# Patient Record
Sex: MALE | Race: WHITE | NOT HISPANIC OR LATINO | URBAN - METROPOLITAN AREA
[De-identification: names, ages, dates, MRNs, and addresses within clinical notes are randomized per-mention and may not be internally consistent; named-entity substitution may affect disease eponyms.]

---

## 2018-04-18 ENCOUNTER — OUTPATIENT (OUTPATIENT)
Dept: OUTPATIENT SERVICES | Facility: HOSPITAL | Age: 53
LOS: 1 days | Discharge: HOME | End: 2018-04-18

## 2018-04-18 DIAGNOSIS — I10 ESSENTIAL (PRIMARY) HYPERTENSION: ICD-10-CM

## 2018-04-18 DIAGNOSIS — N42.9 DISORDER OF PROSTATE, UNSPECIFIED: ICD-10-CM

## 2018-04-18 DIAGNOSIS — D64.9 ANEMIA, UNSPECIFIED: ICD-10-CM

## 2018-04-18 DIAGNOSIS — E55.9 VITAMIN D DEFICIENCY, UNSPECIFIED: ICD-10-CM

## 2018-04-18 DIAGNOSIS — N39.0 URINARY TRACT INFECTION, SITE NOT SPECIFIED: ICD-10-CM

## 2021-01-12 PROBLEM — Z00.00 ENCOUNTER FOR PREVENTIVE HEALTH EXAMINATION: Status: ACTIVE | Noted: 2021-01-12

## 2021-01-20 ENCOUNTER — APPOINTMENT (OUTPATIENT)
Dept: ORTHOPEDIC SURGERY | Facility: CLINIC | Age: 56
End: 2021-01-20
Payer: COMMERCIAL

## 2021-01-20 VITALS — TEMPERATURE: 96.7 F

## 2021-01-20 DIAGNOSIS — M25.859 OTHER SPECIFIED JOINT DISORDERS, UNSPECIFIED HIP: ICD-10-CM

## 2021-01-20 DIAGNOSIS — M89.8X9 OTHER SPECIFIED DISORDERS OF BONE, UNSPECIFIED SITE: ICD-10-CM

## 2021-01-20 DIAGNOSIS — Z78.9 OTHER SPECIFIED HEALTH STATUS: ICD-10-CM

## 2021-01-20 DIAGNOSIS — M25.551 PAIN IN RIGHT HIP: ICD-10-CM

## 2021-01-20 PROCEDURE — 73502 X-RAY EXAM HIP UNI 2-3 VIEWS: CPT | Mod: RT

## 2021-01-20 PROCEDURE — 99204 OFFICE O/P NEW MOD 45 MIN: CPT

## 2021-01-20 PROCEDURE — 99072 ADDL SUPL MATRL&STAF TM PHE: CPT

## 2021-01-20 NOTE — ASSESSMENT
[FreeTextEntry1] : 55 year old male presents for right hip pain. The pain is positional. He is a , he is very active. He performs olympic lifting. He feels a mild pain 2/10 when the hip is in certain positions. And he is concerned that he had severe arthritis in his hip given his lifestyle. He said that he was also very active growing up, he was a  and a . His exam and radiographs are consistent with BEN, and HO. But we will obtain MRI for confirmation. It was recommended he go to [facility] for the study, he should notify us after test is done and we will call him with results.

## 2021-01-20 NOTE — HISTORY OF PRESENT ILLNESS
[de-identified] : 55 year old male presents to the office today complaining of right hip pain x1 year. Patient reports pain that is in the groin, can be at the anterior thigh and radiating into the knee. He reports pain that is achy in nature and comes and goes. Patient denies any swelling, buckling, locking, or loss of motion, but does report noticing a clicking in his right hip. Patient states he is a  and is very active. He does okay with ambulation stating he ran 2 miles this morning. There is pain when he negotiates stairs but states he does okay with this. He also reports noticing discomfort when putting socks and shoes on. Patient is here today to discuss his options for pain relief.  [Pain Location] : pain [] : right hip [5] : a maximum pain level of 5/10 [Intermit.] : ~He/She~ states the symptoms seem to be intermittent

## 2021-01-20 NOTE — PHYSICAL EXAM
[de-identified] : General appearance: well nourished and hydrated, pleasant, alert and oriented x 3, cooperative.\par Cardiovascular: no apparent abnormalities, no lower leg edema, no varicosities, pedal pulses are palpable.\par Neurologic: sensation is normal, no muscle weakness in upper or lower extremities\par Dermatologic no apparent skin lesions, moist, warm, no rash.\par Gait: nonantalgic.\par \par Left knee\par Inspection: no effusion or erythema.\par Wounds: none.\par Alignment: normal.\par Palpation: no specific tenderness on palpation.\par ROM: 0-125 degrees\par Ligamentous laxity: all ligaments appear stable\par Muscle Test: good quad strength.\par \par Right knee\par Inspection: no effusion or erythema.\par Wounds: none.\par Alignment: normal.\par Palpation: no specific tenderness on palpation.\par ROM: 0-125 degrees\par Ligamentous laxity: all ligaments appear stable\par Muscle Test: good quad strength.\par \par Left hip\par Inspection: No swelling or ecchymosis.\par Wounds: none.\par Palpation: non-tender.\par Stability: no instability.\par Strength: 5/5 all motor groups.\par ROM: Flexion to 90 degrees, ER 30, IR 10, ABD 45 degrees, WITHOUT PAIN\par Leg length: equal.\par \par Right hip\par Inspection: No swelling or ecchymosis.\par Wounds: none.\par Palpation: non-tender.\par Stability: no instability.\par Strength: 5/5 all motor groups.\par ROM: Flexion to 85 degrees, IR 5, ER 40, ABD 45 degrees. With flexion and ER of the hip he had 2/10 pain. \par Leg length: equal.\par \par  [de-identified] : Radiographs done today AP pelvis and lateral of the right hip shows the bony structures are intact. well maintained joint spaces bilaterally. 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 On the lateral view the femoral head appears to be slightly misshaped, questionable prominence at head neck junction. And an island of what appears to be heterotopic ossification anterior to proximal femur. Impression 1 DDH 2 BEN 3 HO.

## 2022-03-20 ENCOUNTER — OUTPATIENT (OUTPATIENT)
Dept: OUTPATIENT SERVICES | Facility: HOSPITAL | Age: 57
LOS: 1 days | Discharge: HOME | End: 2022-03-20
Payer: COMMERCIAL

## 2022-03-20 DIAGNOSIS — D21.0 BENIGN NEOPLASM OF CONNECTIVE AND OTHER SOFT TISSUE OF HEAD, FACE AND NECK: ICD-10-CM

## 2022-03-20 PROCEDURE — 70492 CT SFT TSUE NCK W/O & W/DYE: CPT | Mod: 26

## 2023-03-16 ENCOUNTER — APPOINTMENT (OUTPATIENT)
Dept: ORTHOPEDIC SURGERY | Facility: CLINIC | Age: 58
End: 2023-03-16

## 2023-05-01 ENCOUNTER — APPOINTMENT (OUTPATIENT)
Dept: ORTHOPEDIC SURGERY | Facility: CLINIC | Age: 58
End: 2023-05-01
Payer: COMMERCIAL

## 2023-05-01 DIAGNOSIS — S33.5XXA SPRAIN OF LIGAMENTS OF LUMBAR SPINE, INITIAL ENCOUNTER: ICD-10-CM

## 2023-05-01 PROCEDURE — 72110 X-RAY EXAM L-2 SPINE 4/>VWS: CPT

## 2023-05-01 PROCEDURE — 99203 OFFICE O/P NEW LOW 30 MIN: CPT

## 2023-05-01 NOTE — PHYSICAL EXAM
[de-identified] : TTP midline spine and paraspinal musculature \par Strength                                         \par Hip flexor\par   Right: 5/5; Left: 5/5                             \par Knee extensor  \par   Right: 5/5; Left: 5/5                     \par Ankle dorsiflexion\par   Right: 5/5; Left: 5/5                  \par EHL        \par   Right: 5/5; Left: 5/5                                \par Ankle plantarflexion    \par   Right: 5/5; Left: 5/5\par \par Sensation\par L1\par   Right: 2/2; Left: 2/2\par L2\par   Right: 2/2; Left: 2/2\par L3\par   Right: 2/2; Left: 2/2\par L4\par   Right: 2/2; Left: 2/2\par L5\par   Right: 2/2; Left: 2/2\par S1\par   Right: 2/2; Left: 2/2\par \par Reflexes\par Patella\par   Right: 2+; Left 2+\par Achilles\par   Right: 2+; Left 2+\par Clonus\par  Right: absent; L: absent\par

## 2023-05-01 NOTE — DATA REVIEWED
[FreeTextEntry1] : I obtained and reviewed AP lateral flexion-extension lumbar x-ray.  No instability.  Minimal disc degeneration.

## 2023-05-01 NOTE — DISCUSSION/SUMMARY
[de-identified] : 57-year-old male presents with lumbar sprain.  I am giving the patient a prescription for physical therapy.  He can follow-up as needed.  I recommended that he follow-up with interventional pain management if his pain persists.  He is not keen on injections.

## 2023-05-01 NOTE — HISTORY OF PRESENT ILLNESS
[de-identified] : 57-year-old male presents to me with 6 months of low back pain.  He denies any pain radiating down his legs.  Denies numbness or tingling.  He is a  very fit and active.  Denies loss of bladder or bowel.  He has not done formal physical therapy yet.  He has not taken any anti-inflammatories.

## 2023-09-14 ENCOUNTER — EMERGENCY (EMERGENCY)
Facility: HOSPITAL | Age: 58
LOS: 0 days | Discharge: HOME | End: 2023-09-14
Attending: STUDENT IN AN ORGANIZED HEALTH CARE EDUCATION/TRAINING PROGRAM
Payer: COMMERCIAL

## 2023-09-14 VITALS
OXYGEN SATURATION: 98 % | SYSTOLIC BLOOD PRESSURE: 133 MMHG | RESPIRATION RATE: 20 BRPM | TEMPERATURE: 98 F | HEIGHT: 70 IN | WEIGHT: 205.03 LBS | DIASTOLIC BLOOD PRESSURE: 73 MMHG | HEART RATE: 77 BPM

## 2023-09-14 DIAGNOSIS — M25.562 PAIN IN LEFT KNEE: ICD-10-CM

## 2023-09-14 DIAGNOSIS — R50.9 FEVER, UNSPECIFIED: ICD-10-CM

## 2023-09-14 PROCEDURE — 73564 X-RAY EXAM KNEE 4 OR MORE: CPT | Mod: LT

## 2023-09-14 PROCEDURE — 99283 EMERGENCY DEPT VISIT LOW MDM: CPT

## 2023-09-14 PROCEDURE — 73564 X-RAY EXAM KNEE 4 OR MORE: CPT | Mod: 26,LT

## 2023-09-14 PROCEDURE — 99283 EMERGENCY DEPT VISIT LOW MDM: CPT | Mod: 25

## 2023-09-14 RX ORDER — ACETAMINOPHEN 500 MG
650 TABLET ORAL ONCE
Refills: 0 | Status: COMPLETED | OUTPATIENT
Start: 2023-09-14 | End: 2023-09-14

## 2023-09-14 RX ADMIN — Medication 650 MILLIGRAM(S): at 22:30

## 2023-09-14 NOTE — ED PROVIDER NOTE - OBJECTIVE STATEMENT
58-year-old male no notable past medical history coming with complaints of left knee pain.  Reports the knee has been bothering for the past 2 days, burning in nature, feels like it is warm, associated with some tactile fever, kept in check with Motrin around-the-clock, able to ambulate when he is taking the Motrin.  Notes he did return from [place] 3 days ago.  Works as a .  Does note he believes he had similar episodes in the past but much more mild. Denies any  nausea, vomiting, CP, SOB, changes in urination, or changes in bowel movements.

## 2023-09-14 NOTE — ED PROVIDER NOTE - ATTENDING CONTRIBUTION TO CARE
58-year-old male no notable past medical history coming with complaints of left knee pain.  Reports the knee has been bothering for the past 2 days, burning in nature, feels like it is warm, associated with some tactile fever, kept in check with Motrin around-the-clock, able to ambulate when he is taking the Motrin.  Notes he did return from [place] 3 days ago.  Works as a  and noticed pain after a day of working out a lot.  Does note he believes he had similar episodes in the past but much more mild. Denies any  nausea, vomiting, CP, SOB, changes in urination, or changes in bowel movements.    On exam, left knee mildly swollen, non-tender, normal ROM, no erythema or rash or ecchymoses, no calf swelling or tenderness and b/l equal in size, ambulates independently with limp.

## 2023-09-14 NOTE — ED PROVIDER NOTE - NSFOLLOWUPINSTRUCTIONS_ED_ALL_ED_FT
Please follow-up with PCP in 1 to 3 days. Return precautions explained in full to patient/family.    Our Emergency Department Referral Coordinators will be reaching out to you in the next 24-48 hours from 9:00am to 5:00pm with a follow up appointment. Please expect a phone call from the hospital in that time frame. If you do not receive a call or if you have any questions or concerns, you can reach them at   [phone]        English    Gout  A foot with gout, showing uric acid crystals in a joint, along with an image that shows swelling on the outside of the foot.  Gout is a condition that causes painful swelling of the joints. Gout is a type of inflammation of the joints (arthritis). This condition is caused by having too much uric acid in the body. Uric acid is a chemical that forms when the body breaks down substances called purines. Purines are important for building body proteins.    When the body has too much uric acid, sharp crystals can form and build up inside the joints. This causes pain and swelling. Gout attacks can happen quickly and may be very painful (acute gout). Over time, the attacks can affect more joints and become more frequent (chronic gout). Gout can also cause uric acid to build up under the skin and inside the kidneys.    What are the causes?  This condition is caused by too much uric acid in your blood. This can happen because:  Your kidneys do not remove enough uric acid from your blood. This is the most common cause.  Your body makes too much uric acid. This can happen with some cancers and cancer treatments. It can also occur if your body is breaking down too many red blood cells (hemolytic anemia).  You eat too many foods that are high in purines. These foods include organ meats and some seafood. Alcohol, especially beer, is also high in purines.  A gout attack may be triggered by trauma or stress.    What increases the risk?  The following factors may make you more likely to develop this condition:  Having a family history of gout.  Being male and middle-aged.  Being female and having gone through menopause.  Taking certain medicines, including aspirin, cyclosporine, diuretics, levodopa, and niacin.  Having an organ transplant.  Having certain conditions, such as:  Being obese.  Lead poisoning.  Kidney disease.  A skin condition called psoriasis.  Other factors include:  Losing weight too quickly.  Being dehydrated.  Frequently drinking alcohol, especially beer.  Frequently drinking beverages that are sweetened with a type of sugar called fructose.  What are the signs or symptoms?  A foot and ankle with swelling and shiny, purple skin caused by gout.   An attack of acute gout happens quickly. It usually occurs in just one joint. The most common place is the big toe. Attacks often start at night. Other joints that may be affected include joints of the feet, ankle, knee, fingers, wrist, or elbow. Symptoms of this condition may include:  Severe pain.  Warmth.  Swelling.  Stiffness.  Tenderness. The affected joint may be very painful to touch.  Shiny, red, or purple skin.  Chills and fever.  Chronic gout may cause symptoms more frequently. More joints may be involved. You may also have white or yellow lumps (tophi) on your hands or feet or in other areas near your joints.    How is this diagnosed?  This condition is diagnosed based on your symptoms, your medical history, and a physical exam. You may have tests, such as:  Blood tests to measure uric acid levels.  Removal of joint fluid with a thin needle (aspiration) to look for uric acid crystals.  X-rays to look for joint damage.  How is this treated?  Treatment for this condition has two phases: treating an acute attack and preventing future attacks. Acute gout treatment may include medicines to reduce pain and swelling, including:  NSAIDs, such as ibuprofen.  Steroids. These are strong anti-inflammatory medicines that can be taken by mouth (orally) or injected into a joint.  Colchicine. This medicine relieves pain and swelling when it is taken soon after an attack. It can be given by mouth or through an IV.  Preventive treatment may include:  Daily use of smaller doses of NSAIDs or colchicine.  Use of a medicine that reduces uric acid levels in your blood, such as allopurinol.  Changes to your diet. You may need to see a dietitian about what to eat and drink to prevent gout.  Follow these instructions at home:  During a gout attack    Bag of ice on a towel on the skin.   If directed, put ice on the affected area. To do this:  Put ice in a plastic bag.  Place a towel between your skin and the bag.  Leave the ice on for 20 minutes, 2–3 times a day.  Remove the ice if your skin turns bright red. This is very important. If you cannot feel pain, heat, or cold, you have a greater risk of damage to the area.  Raise (elevate) the affected joint above the level of your heart as often as possible.  Rest the joint as much as possible. If the affected joint is in your leg, you may be given crutches to use.  Follow instructions from your health care provider about eating or drinking restrictions.  Avoiding future gout attacks    Follow a low-purine diet as told by your dietitian or health care provider. Avoid foods and drinks that are high in purines, including liver, kidney, anchovies, asparagus, herring, mushrooms, mussels, and beer.  Maintain a healthy weight or lose weight if you are overweight. If you want to lose weight, talk with your health care provider. Do not lose weight too quickly.  Start or maintain an exercise program as told by your health care provider.  Eating and drinking    Avoid drinking beverages that contain fructose.  Drink enough fluids to keep your urine pale yellow.  If you drink alcohol:  Limit how much you have to:  0–1 drink a day for women who are not pregnant.  0–2 drinks a day for men.  Know how much alcohol is in a drink. In the [place] one drink equals one 12 oz bottle of beer (355 mL), one 5 oz glass of wine (148 mL), or one 1½ oz glass of hard liquor (44 mL).  General instructions    Take over-the-counter and prescription medicines only as told by your health care provider.  Ask your health care provider if the medicine prescribed to you requires you to avoid driving or using machinery.  Return to your normal activities as told by your health care provider. Ask your health care provider what activities are safe for you.  Keep all follow-up visits. This is important.  Where to find more information  National Institutes of Health: www.niams.nih.gov  Contact a health care provider if you have:  Another gout attack.  Continuing symptoms of a gout attack after 10 days of treatment.  Side effects from your medicines.  Chills or a fever.  Burning pain when you urinate.  Pain in your lower back or abdomen.  Get help right away if you:  Have severe or uncontrolled pain.  Cannot urinate.  Summary  Gout is painful swelling of the joints caused by having too much uric acid in the body.  The most common site for gout to occur is in the big toe, but it can affect other joints in the body.  Medicines and dietary changes can help to prevent and treat gout attacks.  This information is not intended to replace advice given to you by your health care provider. Make sure you discuss any questions you have with your health care provider.    Document Revised: 09/21/2022 Document Reviewed: 09/21/2022  Elsevier Patient Education © 2023 [employer]. Please follow-up with orthopedics in 1 to 3 days. Return precautions explained in full to patient/family.    Our Emergency Department Referral Coordinators will be reaching out to you in the next 24-48 hours from 9:00am to 5:00pm with a follow up appointment. Please expect a phone call from the hospital in that time frame. If you do not receive a call or if you have any questions or concerns, you can reach them at   [phone]        English    Gout  A foot with gout, showing uric acid crystals in a joint, along with an image that shows swelling on the outside of the foot.  Gout is a condition that causes painful swelling of the joints. Gout is a type of inflammation of the joints (arthritis). This condition is caused by having too much uric acid in the body. Uric acid is a chemical that forms when the body breaks down substances called purines. Purines are important for building body proteins.    When the body has too much uric acid, sharp crystals can form and build up inside the joints. This causes pain and swelling. Gout attacks can happen quickly and may be very painful (acute gout). Over time, the attacks can affect more joints and become more frequent (chronic gout). Gout can also cause uric acid to build up under the skin and inside the kidneys.    What are the causes?  This condition is caused by too much uric acid in your blood. This can happen because:  Your kidneys do not remove enough uric acid from your blood. This is the most common cause.  Your body makes too much uric acid. This can happen with some cancers and cancer treatments. It can also occur if your body is breaking down too many red blood cells (hemolytic anemia).  You eat too many foods that are high in purines. These foods include organ meats and some seafood. Alcohol, especially beer, is also high in purines.  A gout attack may be triggered by trauma or stress.    What increases the risk?  The following factors may make you more likely to develop this condition:  Having a family history of gout.  Being male and middle-aged.  Being female and having gone through menopause.  Taking certain medicines, including aspirin, cyclosporine, diuretics, levodopa, and niacin.  Having an organ transplant.  Having certain conditions, such as:  Being obese.  Lead poisoning.  Kidney disease.  A skin condition called psoriasis.  Other factors include:  Losing weight too quickly.  Being dehydrated.  Frequently drinking alcohol, especially beer.  Frequently drinking beverages that are sweetened with a type of sugar called fructose.  What are the signs or symptoms?  A foot and ankle with swelling and shiny, purple skin caused by gout.   An attack of acute gout happens quickly. It usually occurs in just one joint. The most common place is the big toe. Attacks often start at night. Other joints that may be affected include joints of the feet, ankle, knee, fingers, wrist, or elbow. Symptoms of this condition may include:  Severe pain.  Warmth.  Swelling.  Stiffness.  Tenderness. The affected joint may be very painful to touch.  Shiny, red, or purple skin.  Chills and fever.  Chronic gout may cause symptoms more frequently. More joints may be involved. You may also have white or yellow lumps (tophi) on your hands or feet or in other areas near your joints.    How is this diagnosed?  This condition is diagnosed based on your symptoms, your medical history, and a physical exam. You may have tests, such as:  Blood tests to measure uric acid levels.  Removal of joint fluid with a thin needle (aspiration) to look for uric acid crystals.  X-rays to look for joint damage.  How is this treated?  Treatment for this condition has two phases: treating an acute attack and preventing future attacks. Acute gout treatment may include medicines to reduce pain and swelling, including:  NSAIDs, such as ibuprofen.  Steroids. These are strong anti-inflammatory medicines that can be taken by mouth (orally) or injected into a joint.  Colchicine. This medicine relieves pain and swelling when it is taken soon after an attack. It can be given by mouth or through an IV.  Preventive treatment may include:  Daily use of smaller doses of NSAIDs or colchicine.  Use of a medicine that reduces uric acid levels in your blood, such as allopurinol.  Changes to your diet. You may need to see a dietitian about what to eat and drink to prevent gout.  Follow these instructions at home:  During a gout attack    Bag of ice on a towel on the skin.   If directed, put ice on the affected area. To do this:  Put ice in a plastic bag.  Place a towel between your skin and the bag.  Leave the ice on for 20 minutes, 2–3 times a day.  Remove the ice if your skin turns bright red. This is very important. If you cannot feel pain, heat, or cold, you have a greater risk of damage to the area.  Raise (elevate) the affected joint above the level of your heart as often as possible.  Rest the joint as much as possible. If the affected joint is in your leg, you may be given crutches to use.  Follow instructions from your health care provider about eating or drinking restrictions.  Avoiding future gout attacks    Follow a low-purine diet as told by your dietitian or health care provider. Avoid foods and drinks that are high in purines, including liver, kidney, anchovies, asparagus, herring, mushrooms, mussels, and beer.  Maintain a healthy weight or lose weight if you are overweight. If you want to lose weight, talk with your health care provider. Do not lose weight too quickly.  Start or maintain an exercise program as told by your health care provider.  Eating and drinking    Avoid drinking beverages that contain fructose.  Drink enough fluids to keep your urine pale yellow.  If you drink alcohol:  Limit how much you have to:  0–1 drink a day for women who are not pregnant.  0–2 drinks a day for men.  Know how much alcohol is in a drink. In the [place] one drink equals one 12 oz bottle of beer (355 mL), one 5 oz glass of wine (148 mL), or one 1½ oz glass of hard liquor (44 mL).  General instructions    Take over-the-counter and prescription medicines only as told by your health care provider.  Ask your health care provider if the medicine prescribed to you requires you to avoid driving or using machinery.  Return to your normal activities as told by your health care provider. Ask your health care provider what activities are safe for you.  Keep all follow-up visits. This is important.  Where to find more information  National Institutes of Health: www.niams.nih.gov  Contact a health care provider if you have:  Another gout attack.  Continuing symptoms of a gout attack after 10 days of treatment.  Side effects from your medicines.  Chills or a fever.  Burning pain when you urinate.  Pain in your lower back or abdomen.  Get help right away if you:  Have severe or uncontrolled pain.  Cannot urinate.  Summary  Gout is painful swelling of the joints caused by having too much uric acid in the body.  The most common site for gout to occur is in the big toe, but it can affect other joints in the body.  Medicines and dietary changes can help to prevent and treat gout attacks.  This information is not intended to replace advice given to you by your health care provider. Make sure you discuss any questions you have with your health care provider.    Document Revised: 09/21/2022 Document Reviewed: 09/21/2022  Elsevier Patient Education © 2023 Else[employer] Inc.

## 2023-09-14 NOTE — ED PROVIDER NOTE - PHYSICAL EXAMINATION
CONSTITUTIONAL: NAD  SKIN: Warm dry  HEAD: NCAT  EYES: NL inspection  ENT: MMM  NECK: Supple; non tender.  CARD: RRR  RESP: CTAB  EXT: Left knee with minimal swelling relative to right, warm, nonerythematous, mildly tender.  Patient able to ambulate with some difficulty.  Neurovascularly intact.  NEURO: Grossly unremarkable  PSYCH: Cooperative, appropriate.

## 2023-09-14 NOTE — ED PROVIDER NOTE - CARE PROVIDERS DIRECT ADDRESSES
[email or web address]

## 2023-09-14 NOTE — ED PROVIDER NOTE - CLINICAL SUMMARY MEDICAL DECISION MAKING FREE TEXT BOX
58-year-old male no notable past medical history coming with complaints of left knee pain.  Reports the knee has been bothering for the past 2 days, burning in nature, improved with Motrin 600 mg. On exam, left knee mildly swollen, non-tender, normal ROM, no erythema or rash or ecchymoses, no calf swelling or tenderness and b/l equal in size, ambulates independently with limp. Given strict return precautions and follow up with ortho. Patient and his wife comfortable with discharge and follow up with orthopedics. Imaging ordered and reviewed by me and discussed with patient.

## 2023-09-14 NOTE — ED PROVIDER NOTE - CARE PROVIDER_API CALL
Rajiv Hopper  Orthopaedic Surgery  [address] [place] [address]  Phone: [phone]  Fax: [phone]  Follow Up Time: 1-3 Days

## 2023-09-14 NOTE — ED PROVIDER NOTE - PATIENT PORTAL LINK FT
You can access the FollowMyHealth Patient Portal offered by [facility] by registering at the following website: [email or web address]. By joining [employer]’s FollowMyHealth portal, you will also be able to view your health information using other applications (apps) compatible with our system.

## 2023-09-14 NOTE — ED ADULT TRIAGE NOTE - CHIEF COMPLAINT QUOTE
PT presents to the Ed from home c/o of L knee pain x4 days. Pt denies any recent trauma to the area or fall. Pt states it difficult to put weight on leg. Pt states he took Motrin recently which has helped a bit.

## 2023-09-16 ENCOUNTER — APPOINTMENT (OUTPATIENT)
Dept: ORTHOPEDIC SURGERY | Facility: CLINIC | Age: 58
End: 2023-09-16
Payer: COMMERCIAL

## 2023-09-16 VITALS — HEIGHT: 69 IN | BODY MASS INDEX: 29.62 KG/M2 | WEIGHT: 200 LBS

## 2023-09-16 DIAGNOSIS — M17.12 UNILATERAL PRIMARY OSTEOARTHRITIS, LEFT KNEE: ICD-10-CM

## 2023-09-16 PROCEDURE — 99203 OFFICE O/P NEW LOW 30 MIN: CPT

## 2023-10-17 ENCOUNTER — APPOINTMENT (OUTPATIENT)
Dept: ORTHOPEDIC SURGERY | Facility: CLINIC | Age: 58
End: 2023-10-17

## 2024-11-15 ENCOUNTER — APPOINTMENT (OUTPATIENT)
Dept: CARDIOLOGY | Facility: CLINIC | Age: 59
End: 2024-11-15
Payer: COMMERCIAL

## 2024-11-15 ENCOUNTER — NON-APPOINTMENT (OUTPATIENT)
Age: 59
End: 2024-11-15

## 2024-11-15 VITALS
SYSTOLIC BLOOD PRESSURE: 125 MMHG | WEIGHT: 216 LBS | HEART RATE: 73 BPM | BODY MASS INDEX: 31.99 KG/M2 | HEIGHT: 69 IN | DIASTOLIC BLOOD PRESSURE: 80 MMHG

## 2024-11-15 DIAGNOSIS — Z87.898 PERSONAL HISTORY OF OTHER SPECIFIED CONDITIONS: ICD-10-CM

## 2024-11-15 DIAGNOSIS — R06.02 SHORTNESS OF BREATH: ICD-10-CM

## 2024-11-15 DIAGNOSIS — I25.119 ATHEROSCLEROTIC HEART DISEASE OF NATIVE CORONARY ARTERY WITH UNSPECIFIED ANGINA PECTORIS: ICD-10-CM

## 2024-11-15 DIAGNOSIS — Z78.9 OTHER SPECIFIED HEALTH STATUS: ICD-10-CM

## 2024-11-15 DIAGNOSIS — E78.5 HYPERLIPIDEMIA, UNSPECIFIED: ICD-10-CM

## 2024-11-15 PROCEDURE — 99204 OFFICE O/P NEW MOD 45 MIN: CPT | Mod: 25

## 2024-11-15 PROCEDURE — 93000 ELECTROCARDIOGRAM COMPLETE: CPT

## 2024-12-16 ENCOUNTER — APPOINTMENT (OUTPATIENT)
Dept: CARDIOLOGY | Facility: CLINIC | Age: 59
End: 2024-12-16
Payer: COMMERCIAL

## 2024-12-16 DIAGNOSIS — I25.119 ATHEROSCLEROTIC HEART DISEASE OF NATIVE CORONARY ARTERY WITH UNSPECIFIED ANGINA PECTORIS: ICD-10-CM

## 2024-12-16 DIAGNOSIS — R06.02 SHORTNESS OF BREATH: ICD-10-CM

## 2024-12-16 PROCEDURE — 93325 DOPPLER ECHO COLOR FLOW MAPG: CPT

## 2024-12-16 PROCEDURE — 93320 DOPPLER ECHO COMPLETE: CPT

## 2024-12-16 PROCEDURE — 93351 STRESS TTE COMPLETE: CPT

## 2025-02-03 ENCOUNTER — APPOINTMENT (OUTPATIENT)
Dept: UROLOGY | Facility: CLINIC | Age: 60
End: 2025-02-03
Payer: COMMERCIAL

## 2025-02-03 VITALS
TEMPERATURE: 98.5 F | HEART RATE: 69 BPM | BODY MASS INDEX: 31.1 KG/M2 | DIASTOLIC BLOOD PRESSURE: 81 MMHG | SYSTOLIC BLOOD PRESSURE: 129 MMHG | HEIGHT: 69 IN | OXYGEN SATURATION: 96 % | RESPIRATION RATE: 18 BRPM | WEIGHT: 210 LBS

## 2025-02-03 DIAGNOSIS — R97.20 ELEVATED PROSTATE, SPECIFIC ANTIGEN [PSA]: ICD-10-CM

## 2025-02-03 DIAGNOSIS — N40.1 BENIGN PROSTATIC HYPERPLASIA WITH LOWER URINARY TRACT SYMPMS: ICD-10-CM

## 2025-02-03 DIAGNOSIS — R35.1 BENIGN PROSTATIC HYPERPLASIA WITH LOWER URINARY TRACT SYMPMS: ICD-10-CM

## 2025-02-03 PROCEDURE — G2211 COMPLEX E/M VISIT ADD ON: CPT | Mod: NC

## 2025-02-03 PROCEDURE — 99204 OFFICE O/P NEW MOD 45 MIN: CPT

## 2025-02-03 PROCEDURE — 81003 URINALYSIS AUTO W/O SCOPE: CPT | Mod: QW

## 2025-02-06 ENCOUNTER — NON-APPOINTMENT (OUTPATIENT)
Age: 60
End: 2025-02-06

## 2025-02-06 LAB
BACTERIA UR CULT: NORMAL
BILIRUB UR QL STRIP: NORMAL
COLLECTION METHOD: NORMAL
GLUCOSE UR-MCNC: NORMAL
HCG UR QL: 1 EU/DL
HGB UR QL STRIP.AUTO: NORMAL
KETONES UR-MCNC: NORMAL
LEUKOCYTE ESTERASE UR QL STRIP: NORMAL
NITRITE UR QL STRIP: NORMAL
PH UR STRIP: 6.5
PROT UR STRIP-MCNC: NORMAL
SP GR UR STRIP: 1.01

## 2025-06-30 ENCOUNTER — APPOINTMENT (OUTPATIENT)
Dept: UROLOGY | Facility: CLINIC | Age: 60
End: 2025-06-30

## 2025-09-03 ENCOUNTER — OUTPATIENT (OUTPATIENT)
Dept: OUTPATIENT SERVICES | Facility: HOSPITAL | Age: 60
LOS: 1 days | End: 2025-09-03
Payer: COMMERCIAL

## 2025-09-03 ENCOUNTER — RESULT REVIEW (OUTPATIENT)
Age: 60
End: 2025-09-03

## 2025-09-03 DIAGNOSIS — R97.20 ELEVATED PROSTATE SPECIFIC ANTIGEN [PSA]: ICD-10-CM

## 2025-09-03 PROCEDURE — A9579: CPT

## 2025-09-03 PROCEDURE — 72197 MRI PELVIS W/O & W/DYE: CPT | Mod: 26

## 2025-09-03 PROCEDURE — 72197 MRI PELVIS W/O & W/DYE: CPT

## 2025-09-04 DIAGNOSIS — R97.20 ELEVATED PROSTATE SPECIFIC ANTIGEN [PSA]: ICD-10-CM

## 2025-09-15 ENCOUNTER — NON-APPOINTMENT (OUTPATIENT)
Age: 60
End: 2025-09-15